# Patient Record
Sex: FEMALE | Race: WHITE | NOT HISPANIC OR LATINO | URBAN - METROPOLITAN AREA
[De-identification: names, ages, dates, MRNs, and addresses within clinical notes are randomized per-mention and may not be internally consistent; named-entity substitution may affect disease eponyms.]

---

## 2017-10-09 NOTE — PATIENT DISCUSSION
- OCT shows: very poor signal strength OD due to cataract. 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

## 2017-10-09 NOTE — PATIENT DISCUSSION
- Need to determine patient's glaucoma status before proceeding with surgery.  Will discuss again in 1 month after she has had a HVF

## 2017-10-09 NOTE — PATIENT DISCUSSION
- Discussed the risks, benefits, and alternatives of cataract surgery including but not limited to infection, bleeding, posterior capsular tear, need for additional surgery including secondary IOL or vitrectomy, the continued need to wear glasses at both distance and near, and permanent loss of vision. There is no guarantee that cataract surgery will improve the patient's vision or reduce glare/halos.  The patient appears to understand and wishes to proceed with surgery

## 2017-12-07 NOTE — PATIENT DISCUSSION
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

## 2017-12-07 NOTE — PATIENT DISCUSSION
- Has significant corneal cylinder, but will need glasses for her high cylinder OS (already pseudophakic).  Discussed a toric IOL, but patient would prefer a standard monofocal.

## 2018-01-11 NOTE — PATIENT DISCUSSION
- Patient understands that she will still need to wear glasses after surgery to correct her corneal astigmatism

## 2018-02-28 NOTE — PATIENT DISCUSSION
Continue: prednisolone acetate (prednisolone acetate): drops,suspension: 1% 1 drop four times a day as directed into right eye 02-

## 2018-03-29 NOTE — PATIENT DISCUSSION
- Follow up in 6 months for [facility] and glaucoma testing

## 2018-03-29 NOTE — PATIENT DISCUSSION
- OCT shows: very poor signal strength at last test OD due to cataract. 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

## 2018-07-27 NOTE — PATIENT DISCUSSION
- Anticoagulants: ASA Area on rt side that protrudes  Dry dressing at umbilicus from previous surgery

## 2018-09-28 NOTE — PATIENT DISCUSSION
- OCT shows: Very thin sup/inf OU, likely nearing floor effect. 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

## 2018-10-18 NOTE — PATIENT DISCUSSION
RETINA IS ATTACHED OU: PVD OU; OPERCULATED RETINAL HOLE OD; NO HOLES OR TEARS SEEN ON DILATED EXAM OS.  RETINAL DETACHMENT SIGNS AND SYMPTOMS REVIEWED

## 2018-10-18 NOTE — PATIENT DISCUSSION
Post-laser (ADÁN) Counseling:  Treatment of retinal breaks with laser greatly reduces but does not completely eliminate the risk of retinal detachment. Keep any scheduled appointments for re-evaluation of the retina, and report any increase in flashing lights or floaters.

## 2018-11-15 NOTE — PATIENT DISCUSSION
POST-OP WEEK 4 EXAM: S/P LASER RETINOPEXY OD; Doing well today. Retina attached. IOP within acceptable limits. Continue eyedrops in the surgical eye as instructed. Begin Pred taper. Discontinue Cipro and Atropine. Retinal detachment and endophthalmitis precautions reviewed. Instructed to call immediately for worsening vision, eye pain, or eye discharge.

## 2019-07-10 ENCOUNTER — IMPORTED ENCOUNTER (OUTPATIENT)
Dept: URBAN - METROPOLITAN AREA CLINIC 31 | Facility: CLINIC | Age: 34
End: 2019-07-10

## 2019-07-10 PROBLEM — H27.02: Noted: 2019-07-10

## 2019-07-10 PROBLEM — H53.012: Noted: 2019-07-10

## 2019-07-10 PROCEDURE — 92015 DETERMINE REFRACTIVE STATE: CPT

## 2019-07-10 PROCEDURE — 92310 CONTACT LENS FITTING OU: CPT

## 2019-07-10 PROCEDURE — 92004 COMPRE OPH EXAM NEW PT 1/>: CPT

## 2019-07-10 NOTE — PATIENT DISCUSSION
1.  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 2. Aphakia OS - 3. Nystagmus-  Never developed due to cat4. Wears 14 days cls for about 1 mth---Disc Dailies and and interested. Had infection 1 yr ago. Disp trials of Moist and My day -0.50-0.75x170.  5 each. Eval; 100 with copay 60. Pt will call and let us know if likes. Has $155 for materials. VSP5. Protect OD6.   RTN 1 yr CE  VSP

## 2019-08-20 NOTE — PATIENT DISCUSSION
Recommend refractive lens Exchange OU; OPTION ONE : MONOVISION  OD CALEB ; OS -2.00 : OPTION TWO : SYM OD Goal CALEB; TMF+3.25 Goal CALEB OS.

## 2019-08-30 NOTE — PATIENT DISCUSSION
Patient informed on neuroadaptation, richie/va, increase near lighting, near focal point, and TMF OS.

## 2019-08-30 NOTE — PATIENT DISCUSSION
See [facility]

## 2019-09-12 NOTE — PATIENT DISCUSSION
Vit E oil qhs x 1 month. [place] ED to IP Report (EDIP)    Mental Status     Alert and oriented    Safety     Fall Risk    Mobility    Up with 1 Assist    Protocols  Pneumonia     Pneumonia Antibiotics:   Administer dual therapy (2nd) antibiotic when Azithromycin is completed.      ISAR   2 (01/12/18 9354)    Isolation  Droplet     Additional Information:  Vanco sent with pt. Cefipime given. Zithromax running

## 2019-10-17 NOTE — PATIENT DISCUSSION
Continue: Systane Ultra (peg 400-propylene glycol): drops: 0.4-0.3%
Diagnosis:Primary open-angle glaucoma, both eyes, moderate stage
General:
Laser trabeculoplasty (SLT or ALT): OS
Medications:
PRIMARY OPEN ANGLE GLAUCOMA, OU: - No known family history of glaucoma - No history of steroid use or ocular trauma - No evidence of pigment dispersion or PXF - IOP: high teens/20 OU - Pachymetry: pending - Gonioscopy (dilated): open to TM OD, CBB OS (pseudophakic) - OCT shows: very poor signal strength OD due to cataract. 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 - HVF shows: dense paracentral scotoma OD, moderate SNS OS - May be a phacomorphic component with fluctuating IOP OD Plan: - SLT OS today, tolerated well.  - Plan for phaco-iStent OD - Will monitor IOP OD after surgery, may need SLT OD as well
Procedures:
pt met pacu criteria to be d/cd' home. vitals stable, pain controlled, no bleeding noted, no hematoma @ sheath site, positive pulses.  OOB ambulating w/o difficulty, tolerating PO intake.  Copy of post op discharge/medication reconciliation instructions given via .  Pt verbalized understanding.  IV d/c'd.  Pt escorted to lobby in wheelchair w/nursing attendant and wife

## 2020-01-08 NOTE — PROCEDURE NOTE: SURGICAL
Prior to commencing surgery, Dr. Nina Mcclain confirmed patient identification, surgical procedure, site and side. Following topical proparacaine anesthesia, the patient was positioned at the YAG laser, a contact lens was coupled to the cornea of the right eye with methylcellulose and an axial posterior capsulotomy was performed without complication using 2.7 Mj x 36. Attention was turned to the left eye and a contact lens was coupled to the cornea of the left eye with methylcellulose and an axial posterior capsulotomy was performed without complication using 2.7 Mj x 20. Excess methylcellulose was washed from both eyes, one drop of Alphagan was instilled in each eye and the patient returned to the holding area having tolerated the procedure well and without complication. <br />[facility]:[id number]<HA /><br />

## 2020-02-13 NOTE — PATIENT DISCUSSION
Patient elects to proceed with LVC with goal marvin OS.  Chart to [facility] to approve LVC goal OS.

## 2020-02-13 NOTE — PROCEDURE NOTE: CLINICAL
PROCEDURE NOTE: Punctal Plugs, Silicone #1 OU. Diagnosis: Dry Eye Syndrome. Anesthesia: Topical. Prep: Antibiotic Drops q 5min x 3. Prior to treatment, the risks/benefits/alternatives were discussed. The patient wished to proceed with procedure. One drop of proparacaine was placed and a drop of lidocaine gel was placed over the puncta. Eaglevision Superflex 0.9 mm RLL, Oasis Softplug 0.8 LLL mm permanent silicone plugs were inserted in Both lower eyelids. Patient tolerated procedure well. There were no complications. Post procedure instructions given. Agustin Harris

## 2020-07-14 ENCOUNTER — IMPORTED ENCOUNTER (OUTPATIENT)
Dept: URBAN - METROPOLITAN AREA CLINIC 31 | Facility: CLINIC | Age: 35
End: 2020-07-14

## 2020-07-14 PROBLEM — H53.012: Noted: 2020-07-14

## 2020-07-14 PROBLEM — H27.02: Noted: 2020-07-14

## 2020-07-14 PROCEDURE — 92310 CONTACT LENS FITTING OU: CPT

## 2020-07-14 PROCEDURE — 92015 DETERMINE REFRACTIVE STATE: CPT

## 2020-07-14 PROCEDURE — 92014 COMPRE OPH EXAM EST PT 1/>: CPT

## 2020-07-14 NOTE — PATIENT DISCUSSION
1.  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 2. Aphakia OS - 3. Nystagmus-  Never developed due to cat4. Wears 14 days cls for about 1 mth---Pt tried dailies but did not like--Moist and My day. Pt will order boxes of Oasys 14 day  -0.50-0.75x170. Eval; 100 with copay 60. Has $155 for materials. VSP5. Protect OD6.   RTN 1 yr CE  VSP

## 2020-10-09 NOTE — PATIENT DISCUSSION
- Follow up in 1 year for [facility] [place] [facility]

## 2021-08-16 ENCOUNTER — IMPORTED ENCOUNTER (OUTPATIENT)
Dept: URBAN - METROPOLITAN AREA CLINIC 31 | Facility: CLINIC | Age: 36
End: 2021-08-16

## 2021-08-16 PROBLEM — H53.012: Noted: 2021-08-16

## 2021-08-16 PROBLEM — H27.02: Noted: 2021-08-16

## 2021-08-16 PROBLEM — H16.141: Noted: 2021-08-16

## 2021-08-16 PROCEDURE — 92015 DETERMINE REFRACTIVE STATE: CPT

## 2021-08-16 PROCEDURE — V2521 CNTCT LENS HYDROPHILIC TORIC: HCPCS

## 2021-08-16 PROCEDURE — 92310 CONTACT LENS FITTING OU: CPT

## 2021-08-16 PROCEDURE — 92014 COMPRE OPH EXAM EST PT 1/>: CPT

## 2021-08-16 NOTE — PATIENT DISCUSSION
1.  SPK OD:    Use tears more--gave sample artificial tears2. 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 3. Aphakia OS - 4. Nystagmus-  Never developed due to cat5. Wears 14 days cls for about 1 mth---Pt tried dailies but did not like--Moist and My day. Cont with  Oasys 14 day  -0.50-0.75x170. Eval; 120 with copay 60. Has $150 for materials. VSP6. Protect OD7.   RTN 1 yr CE  VSP

## 2022-04-02 ASSESSMENT — TONOMETRY
OS_IOP_MMHG: 16
OD_IOP_MMHG: 16
OS_IOP_MMHG: 16
OD_IOP_MMHG: 16
OS_IOP_MMHG: 16
OD_IOP_MMHG: 16

## 2022-04-02 ASSESSMENT — VISUAL ACUITY
OD_CC: 20/40+3
OS_CC: CF@1'
OS_CC: CF@1'
OD_CC: 20/30
OD_CC: 20/25-2

## 2022-08-17 ENCOUNTER — ESTABLISHED PATIENT (OUTPATIENT)
Dept: URBAN - METROPOLITAN AREA CLINIC 29 | Facility: CLINIC | Age: 37
End: 2022-08-17

## 2022-08-17 DIAGNOSIS — H27.02: ICD-10-CM

## 2022-08-17 DIAGNOSIS — H53.012: ICD-10-CM

## 2022-08-17 PROCEDURE — 99214 OFFICE O/P EST MOD 30 MIN: CPT

## 2022-08-17 ASSESSMENT — VISUAL ACUITY
OD_CC: 20/20
OD_CC: 20/20-2
OS_CC: J18
OS_SC: CF 1FT
OS_CC: CF 1FT
OD_SC: 20/30+2

## 2022-08-17 ASSESSMENT — TONOMETRY
OD_IOP_MMHG: 16
OS_IOP_MMHG: 16

## 2023-01-09 NOTE — PATIENT DISCUSSION
Instructed to call immediately if any new distortion, blurring, decreased vision or eye pain. LOV:07/22/2022  RTC:01/24/2023

## 2023-08-18 NOTE — PATIENT DISCUSSION
Physical Therapy Evaluation    Visit Type: Initial Evaluation  Visit: 1  Referring Provider: VASQUEZ Bolaños  Medical Diagnosis (from order): Diagnosis Information    Diagnosis  V45.89 (ICD-9-CM) - Z98.890 (ICD-10-CM) - S/P foot surgery       Treatment Diagnosis: left ankle - impaired range of motion, impaired mobility, impaired balance, impaired strength, increased swelling, impaired gait and impaired activity tolerance.  Onset  - Date of surgery: 6/28/2023  - Procedure: Left ankle arthroscopy with osteochondral lesion repair, left brostrom lateral ankle stabilization, left deep peroneal nerve release, left second and third metatarsal curneiform joint fusion  Chart reviewed at time of initial evaluation (relevant co-morbidities, allergies, tests and medications listed):   S/p Left ankle arthroscopy with osteochondral lesion repair, left brostrom lateral ankle stabilization, left deep peroneal nerve release, left second and third metatarsal curneiform joint fusion on 06/28/23    Pt is WBAT in Walking boot       SUBJECTIVE                                                                                                               Pt is 7 weeks s/p L ankle fusion and Brostrom procedure. Pt reports she was casted after surgery, was placed in the walking boot about 2 weeks ago using the knee scooter for ambulation. She notes that she has been working on ankle DF/PF and toe scrunches. She is not sure about her WB status, she has an apt with her NP next week. When asked about pain she notes \"just the normal wear and tears\" and talks about her wrists, shoulders and R knee. She denies pain in L ankle. Pt goals are to return to walking around yard, garden, yard work, cleaning the house. She notes she has had 5 falls while she was casted since the surgery.     Function:   Limitations / Exacerbation Factors:   - Patient reports difficulty and increased time with function reported below.  - , driving/riding in a vehicle,  [place] Distance: grocery shopping, house/yard work, bending/squatting/lifting, community distances, household distances, now needs to use assistive device, stairs, walking quickly as required to cross a street/exit a building rapidly  Prior Level of Function: worsening pain and function, therefore underwent surgery,    Patient Goals: decreased pain, increased strength and independence with ADLs/IADLs.    Prior treatment  - no therapies  - Discharged from hospital, home health, or skilled nursing facility in last 30 days: no  Home Environment   - Patient lives with: significant other and young children  - Assistance available: caregiver as needed  - Reported 2 or more falls or an unexplained fall with injury in the last year.  - patient currently being seen in PT  - Feel safe at home / work / school: yes      OBJECTIVE                                                                                                                     Range of Motion (ROM)   (degrees unless noted; active unless noted; norms in ( ); negative=lacking to 0, positive=beyond 0)  Ankle:   - Dorsiflexion (20):      • Left:  -10  passive: 10    - Plantar Flexion (45-50):      • Left:  40    - Inversion (30-35):     • Left: 38   - Eversion (15-20):     • Left: 7  First MTP (Hallux):    - Extension (50-70):        • Left: Passive: 79    Strength  (out of 5 unless noted, standard test position unless noted)   Comments / Details: Not tested due to protocol AROM only Pt 3/5 for all ROM          Palpation  Scar mobility WNL               Outcome/Assessments  Outcome Measures:   Lower Extremity Functional Scale: LEFS Calculated Total: 13 (0=extreme difficulty; 80=no difficulty) see flowsheet for additional documentation        Treatment     Therapeutic Exercise  - Supine Single Leg Ankle Pumps  - 1 x daily - 7 x weekly - 3 sets - 10 reps  - Supine Ankle Eversion AROM  - 1 x daily - 7 x weekly - 3 sets - 10 reps  - Long Sitting Calf Stretch with Strap  - 1 x daily - 7  x weekly - 3 sets - 30 sec hold  - Side to Side Weight Shift with Counter Support  - 1 x daily - 7 x weekly - 3 sets - 10 reps    Patient Education  - Scar Massage    Home Exercise Program  Access Code: [email or web address]  URL: [email or web address]  Date: 08/18/2023  Prepared by: Carmina Wadsworth    Exercises  - Supine Single Leg Ankle Pumps  - 1 x daily - 7 x weekly - 3 sets - 10 reps  - Supine Ankle Eversion AROM  - 1 x daily - 7 x weekly - 3 sets - 10 reps  - Long Sitting Calf Stretch with Strap  - 1 x daily - 7 x weekly - 3 sets - 30 sec hold  - Side to Side Weight Shift with Counter Support  - 1 x daily - 7 x weekly - 3 sets - 10 reps    Patient Education  - Scar Massage      ASSESSMENT                                                                                                          Treatment Diagnosis:   - Involved: left ankle.  - Symptoms/impairments: impaired range of motion, impaired mobility, impaired balance, impaired strength, increased swelling, impaired gait and impaired activity tolerance.    Prognosis: Patient will benefit from skilled therapy.  Rehabilitative potential is: good.  Predicted patient presentation: Low (stable) - Patient comorbidities and complexities, as defined above, will have little effect on progress for prescribed plan of care.  Education:   - Present and ready to learn: patient    PLAN                                                                                                                         The following skilled interventions to be implemented to achieve goals listed below:  Neuromuscular Re-Education (65348)  Therapeutic Activity (83646)  Therapeutic Exercise (30799)  Manual Therapy (81179)  Gait Training (97177)  Dry Needling  Electrical Stimulation Attended (97870)  Ultrasound/Phonophoresis (94106)    Frequency / Duration  2 times per week tapering as patient progresses for 12 weeks for an estimated total of 24 visits    Patient involved in and  agreed to plan of care and goals.      Goals  Long Term Goals: to be met by end of plan of care  1. Patient will demonstrate ability to negotiate level and unlevel surfaces at variable velocities, including change of direction without increased pain or instability to return to age appropriate and community activities at prior level of function.  2. Patient will ascend and descend 1 flight of steps and without hand rail for completion of household tasks such as laundry  3. Patient will stand for 60 minutes for completion of household tasks such as cooking      Therapy procedure time and total treatment time can be found documented on the Time Entry flowsheet

## 2023-10-04 ENCOUNTER — COMPREHENSIVE EXAM (OUTPATIENT)
Dept: URBAN - METROPOLITAN AREA CLINIC 29 | Facility: CLINIC | Age: 38
End: 2023-10-04

## 2023-10-04 DIAGNOSIS — H52.11: ICD-10-CM

## 2023-10-04 DIAGNOSIS — H52.201: ICD-10-CM

## 2023-10-04 PROCEDURE — 92310-2 LEVEL 2 CONTACT LENS MANAGEMENT

## 2023-10-04 PROCEDURE — 92015 DETERMINE REFRACTIVE STATE: CPT

## 2023-10-04 PROCEDURE — 92014 COMPRE OPH EXAM EST PT 1/>: CPT

## 2023-10-04 ASSESSMENT — VISUAL ACUITY
OD_CC: 20/25-1
OD_CC: 20/25
OS_CC: CF 1FT

## 2023-10-04 ASSESSMENT — TONOMETRY
OD_IOP_MMHG: 15
OS_IOP_MMHG: 16

## 2023-10-23 NOTE — PATIENT DISCUSSION
03/29/2018OS-1.00+2.5090+[id number]/25 +&nbsp;SN &nbsp; &nbsp; kos Bed: 07  Expected date: 10/23/23  Expected time:   Means of arrival:   Comments:  2 nd triage

## 2023-12-28 NOTE — PATIENT DISCUSSION
Recommended artificial tears to use: 1 drop 4x a day in both eyes. Photo Preface (Leave Blank If You Do Not Want): Photographs were obtained today Detail Level: Zone

## 2024-04-05 NOTE — PATIENT DISCUSSION
Lids/Brow in good position. Please advise patient to increase the dose of insulin glargine to 15 units daily and continue monitoring blood glucose levels 3 times daily and call us back if it's greater than 250 or less than 70.